# Patient Record
Sex: FEMALE | Race: WHITE | NOT HISPANIC OR LATINO | Employment: OTHER | URBAN - METROPOLITAN AREA
[De-identification: names, ages, dates, MRNs, and addresses within clinical notes are randomized per-mention and may not be internally consistent; named-entity substitution may affect disease eponyms.]

---

## 2018-05-29 ENCOUNTER — OFFICE VISIT (OUTPATIENT)
Dept: ORTHOPEDIC SURGERY | Facility: CLINIC | Age: 68
End: 2018-05-29

## 2018-05-29 VITALS — BODY MASS INDEX: 26.49 KG/M2 | WEIGHT: 159 LBS | TEMPERATURE: 98.1 F | HEIGHT: 65 IN

## 2018-05-29 DIAGNOSIS — Z96.653 HX OF TOTAL KNEE ARTHROPLASTY, BILATERAL: Primary | ICD-10-CM

## 2018-05-29 DIAGNOSIS — M70.50 PES ANSERINE BURSITIS: ICD-10-CM

## 2018-05-29 PROCEDURE — 73562 X-RAY EXAM OF KNEE 3: CPT | Performed by: NURSE PRACTITIONER

## 2018-05-29 PROCEDURE — 20610 DRAIN/INJ JOINT/BURSA W/O US: CPT | Performed by: NURSE PRACTITIONER

## 2018-05-29 PROCEDURE — 99213 OFFICE O/P EST LOW 20 MIN: CPT | Performed by: NURSE PRACTITIONER

## 2018-05-29 RX ORDER — METHYLPREDNISOLONE ACETATE 80 MG/ML
80 INJECTION, SUSPENSION INTRA-ARTICULAR; INTRALESIONAL; INTRAMUSCULAR; SOFT TISSUE
Status: COMPLETED | OUTPATIENT
Start: 2018-05-29 | End: 2018-05-29

## 2018-05-29 RX ORDER — MULTIVIT WITH MINERALS/LUTEIN
500 TABLET ORAL DAILY
COMMUNITY

## 2018-05-29 RX ORDER — BUPIVACAINE HYDROCHLORIDE 5 MG/ML
1 INJECTION, SOLUTION EPIDURAL; INTRACAUDAL
Status: COMPLETED | OUTPATIENT
Start: 2018-05-29 | End: 2018-05-29

## 2018-05-29 RX ORDER — MULTIPLE VITAMINS W/ MINERALS TAB 9MG-400MCG
1 TAB ORAL DAILY
COMMUNITY

## 2018-05-29 RX ADMIN — BUPIVACAINE HYDROCHLORIDE 1 ML: 5 INJECTION, SOLUTION EPIDURAL; INTRACAUDAL at 16:30

## 2018-05-29 RX ADMIN — METHYLPREDNISOLONE ACETATE 80 MG: 80 INJECTION, SUSPENSION INTRA-ARTICULAR; INTRALESIONAL; INTRAMUSCULAR; SOFT TISSUE at 16:30

## 2018-05-29 NOTE — PROGRESS NOTES
"Patient: Marline Bryant  YOB: 1950 67 y.o. female  Medical Record Number: [id number]    Chief Complaints:   Chief Complaint   Patient presents with   • Right Knee - Establish Care, Pain       History of Present Illness:Marline Bryant is a 67 y.o. female who presents With complaints of bilateral anterior knee pain.  She had both knees replaced with the right one in 2011 left knee replaced 2009.  She denies any injury.  She reports that both knee started getting sore about 3 months ago.  She describes the bilateral knee pain as a moderate intermittent ache worse with sitting and walking slightly better with anti-inflammatories.    Allergies: No Known Allergies    Medications:   Current Outpatient Prescriptions   Medication Sig Dispense Refill   • citalopram (CeleXA) 10 MG tablet Take 10 mg by mouth Daily.     • Fexofenadine HCl (ALLEGRA ALLERGY PO) Take  by mouth.     • Multiple Vitamins-Minerals (MULTIVITAMIN WITH MINERALS) tablet tablet Take 1 tablet by mouth Daily.     • vitamin C (ASCORBIC ACID) 250 MG tablet Take 500 mg by mouth Daily.       No current facility-administered medications for this visit.          The following portions of the patient's history were reviewed and updated as appropriate: allergies, current medications, past family history, past medical history, past social history, past surgical history and problem list.    Review of Systems:   A 14 point review of systems was performed. All systems negative except pertinent positives/negative listed in HPI above    Physical Exam:   Vitals:    05/29/18 1540   Temp: 98.1 °F (36.7 °C)   TempSrc: Temporal Artery    Weight: 72.1 kg (159 lb)   Height: 165.1 cm (65\")   PainSc: 5  Comment: right- hurts more at night   PainLoc: Knee       General: A and O x 3, ASA, NAD    SCLERA:    Normal    DENTITION:   Normal  Skin clear no unusual lesions noted  Bilateral knees patient very tender to touch over bilateral knee has anserine bursa but otherwise " has good range of motion noted bilaterally with no instability calf soft nontender    Radiology:  Xrays 3views (ap,lateral, sunrise) bilateral knees were ordered and reviewed today secondary to pain and show well-placed well-positioned bilateral total knee replacements.  Comparative views are unchanged     Assessment/Plan:  Bilateral knee has anserine bursitis    We will proceed with bilateral knee cortisone injections.  Prior to injections risks were discussed including pain, infection, elevated blood sugar.  Patient verbalized understanding like to proceed with injections.  She was referred outpatient physical therapy and we'll try and office in 4-6 weeks for follow-up of her pain does not resolve    Large Joint Arthrocentesis  Date/Time: 5/29/2018 4:30 PM  Consent given by: patient  Site marked: site marked  Timeout: Immediately prior to procedure a time out was called to verify the correct patient, procedure, equipment, support staff and site/side marked as required   Supporting Documentation  Indications: pain and joint swelling   Procedure Details  Location: knee - R knee  Preparation: Patient was prepped and draped in the usual sterile fashion  Needle size: 18 G  Approach: medial  Medications administered: 80 mg methylPREDNISolone acetate 80 MG/ML; 1 mL bupivacaine (PF) 0.5 %  Patient tolerance: patient tolerated the procedure well with no immediate complications    Large Joint Arthrocentesis  Date/Time: 5/29/2018 4:30 PM  Consent given by: patient  Site marked: site marked  Timeout: Immediately prior to procedure a time out was called to verify the correct patient, procedure, equipment, support staff and site/side marked as required   Supporting Documentation  Indications: joint swelling and pain   Procedure Details  Location: knee - L knee  Preparation: Patient was prepped and draped in the usual sterile fashion  Needle size: 18 G  Approach: medial  Medications administered: 80 mg methylPREDNISolone acetate  80 MG/ML; 1 mL bupivacaine (PF) 0.5 %

## 2018-08-07 ENCOUNTER — OFFICE VISIT (OUTPATIENT)
Dept: ORTHOPEDIC SURGERY | Facility: CLINIC | Age: 68
End: 2018-08-07

## 2018-08-07 VITALS — HEIGHT: 65 IN | BODY MASS INDEX: 28.49 KG/M2 | WEIGHT: 171 LBS | TEMPERATURE: 97.8 F

## 2018-08-07 DIAGNOSIS — M70.50 PES ANSERINE BURSITIS: Primary | ICD-10-CM

## 2018-08-07 PROCEDURE — 99213 OFFICE O/P EST LOW 20 MIN: CPT | Performed by: ORTHOPAEDIC SURGERY

## 2018-08-07 NOTE — PROGRESS NOTES
"Patient: Marline Bryant  YOB: 1950 68 y.o. female  Medical Record Number: [id number]    Chief Complaints:   Chief Complaint   Patient presents with   • Right Knee - Follow-up, Pain       History of Present Illness:Marline Bryant is a 68 y.o. female who presents for follow-up of  Bilateral knees.  She saw June few months ago and had has anserine bursa injections.  They helped for about 3-4 weeks but the pain is now back.  She describes an aching pain about the posterior leg medial knee and even into the posterior calf from time to time    Allergies: No Known Allergies    Medications:   Current Outpatient Prescriptions   Medication Sig Dispense Refill   • citalopram (CeleXA) 10 MG tablet Take 10 mg by mouth Daily.     • Multiple Vitamins-Minerals (MULTIVITAMIN WITH MINERALS) tablet tablet Take 1 tablet by mouth Daily.     • vitamin C (ASCORBIC ACID) 250 MG tablet Take 500 mg by mouth Daily.       No current facility-administered medications for this visit.          The following portions of the patient's history were reviewed and updated as appropriate: allergies, current medications, past family history, past medical history, past social history, past surgical history and problem list.    Review of Systems:   A 14 point review of systems was performed. All systems negative except pertinent positives/negative listed in HPI above    Physical Exam:   Vitals:    08/07/18 1249   Temp: 97.8 °F (36.6 °C)   Weight: 77.6 kg (171 lb)   Height: 165.1 cm (65\")       General: A and O x 3, ASA, NAD    SCLERA:    Normal    DENTITION:   Normal  There is tenderness to palpation about the peds anserine bursa there is no joint effusion no instability good quad strength hamstrings overall fairly tight    Radiology:  Xrays 3views both knees (ap,lateral, sunrise) taken previously demonstratinga well positioned knee replacement without evidence of wear, loosening or osteolysis      Assessment/Plan:  Bilateral knee pad as " anserine bursitis and Cramping I think she would benefit from some physical therapy.  I see no mechanical problems with the knees.  I will set her up with physical therapy and I'll see her back as needed.

## 2018-08-20 ENCOUNTER — TRANSCRIBE ORDERS (OUTPATIENT)
Dept: ADMINISTRATIVE | Facility: HOSPITAL | Age: 68
End: 2018-08-20

## 2018-08-20 DIAGNOSIS — Z78.0 POST-MENOPAUSAL: Primary | ICD-10-CM

## 2018-08-29 ENCOUNTER — HOSPITAL ENCOUNTER (OUTPATIENT)
Dept: BONE DENSITY | Facility: HOSPITAL | Age: 68
Discharge: HOME | End: 2018-08-29
Attending: INTERNAL MEDICINE | Admitting: INTERNAL MEDICINE

## 2018-08-29 DIAGNOSIS — Z78.0 POST-MENOPAUSAL: ICD-10-CM

## 2018-08-29 PROCEDURE — 77080 DXA BONE DENSITY AXIAL: CPT

## 2019-06-11 ENCOUNTER — OFFICE VISIT (OUTPATIENT)
Dept: ORTHOPEDIC SURGERY | Facility: CLINIC | Age: 69
End: 2019-06-11

## 2019-06-11 VITALS — HEIGHT: 65 IN | WEIGHT: 165 LBS | BODY MASS INDEX: 27.49 KG/M2 | TEMPERATURE: 98.5 F

## 2019-06-11 DIAGNOSIS — M70.50 PES ANSERINE BURSITIS: ICD-10-CM

## 2019-06-11 DIAGNOSIS — Z96.653 HX OF TOTAL KNEE ARTHROPLASTY, BILATERAL: Primary | ICD-10-CM

## 2019-06-11 PROCEDURE — 99213 OFFICE O/P EST LOW 20 MIN: CPT | Performed by: ORTHOPAEDIC SURGERY

## 2019-06-11 PROCEDURE — 73562 X-RAY EXAM OF KNEE 3: CPT | Performed by: ORTHOPAEDIC SURGERY

## 2019-06-11 NOTE — PROGRESS NOTES
"Patient: Marline Bryant  YOB: 1950 68 y.o. female  Medical Record Number: [id number]    Chief Complaints:   Chief Complaint   Patient presents with   • Left Knee - Follow-up, Pain   • Right Knee - Follow-up, Pain       History of Present Illness:Marline Bryant is a 68 y.o. female who presents for follow-up of bilateral medial knee pain which is severe at times both knees have been replaced.  She has developed has anserine bursitis had injections a while ago which helped but were short-lived.  Last time I saw her we talked about physical therapy but she is been very busy working 2 jobs on her feet all day.  Unfortunately the pain is progressed to where it is severe she has associated swelling and is quite limited    Allergies: No Known Allergies    Medications:   Current Outpatient Medications   Medication Sig Dispense Refill   • citalopram (CeleXA) 10 MG tablet Take 10 mg by mouth Daily.     • Multiple Vitamins-Minerals (MULTIVITAMIN WITH MINERALS) tablet tablet Take 1 tablet by mouth Daily.     • vitamin C (ASCORBIC ACID) 250 MG tablet Take 500 mg by mouth Daily.       No current facility-administered medications for this visit.          The following portions of the patient's history were reviewed and updated as appropriate: allergies, current medications, past family history, past medical history, past social history, past surgical history and problem list.    Review of Systems:   A 14 point review of systems was performed. All systems negative except pertinent positives/negative listed in HPI above    Physical Exam:   Vitals:    06/11/19 1108   Temp: 98.5 °F (36.9 °C)   Weight: 74.8 kg (165 lb)   Height: 165.1 cm (65\")       General: A and O x 3, ASA, NAD    SCLERA:    Normal    DENTITION:   Normal  Knee:  bilateral    ALIGNMENT:     Neutral  ,   Patella tracks   midline    GAIT:     Nonantalgic    SKIN:    No abnormality    RANGE OF MOTION:   0  -  135   DEG    STRENGTH:   5 / 5    LIGAMENTS:    " No varus / valgus instability.   Negative  Lachman.    MENISCUS:     Negative   Harvey       DISTAL PULSES:    Paplable    DISTAL SENSATION :   Intact    LYMPHATICS:     No   lymphadenopathy    OTHER:          - No  effusion      - No crepitance with ROM      +Swelling and tenderness to palpation pes anserine bursa     Radiology:  Xrays 3views both knees (ap,lateral, sunrise) were ordered and reviewed for evaluation of knee pain demonstrating well positioned knee replacements without evidence of wear, loosening or osteolysis  todays xrays were compared to previous xrays and demonstrate no change    Assessment/Plan:  Bilateral knee peds anserine bursitis I see no issues with her replacements radiographically or on exam.  I am going to have her see physical therapy for hamstring stretching local modalities and have given her prescription for anti-inflammatory gel to apply firmly twice a day.  If she is not better would consider injections again in the future

## 2019-06-18 ENCOUNTER — TREATMENT (OUTPATIENT)
Dept: PHYSICAL THERAPY | Facility: CLINIC | Age: 69
End: 2019-06-18

## 2019-06-18 DIAGNOSIS — M25.562 ACUTE PAIN OF LEFT KNEE: ICD-10-CM

## 2019-06-18 DIAGNOSIS — M25.561 ACUTE PAIN OF RIGHT KNEE: Primary | ICD-10-CM

## 2019-06-18 PROCEDURE — G0283 ELEC STIM OTHER THAN WOUND: HCPCS | Performed by: PHYSICAL THERAPIST

## 2019-06-18 PROCEDURE — 97161 PT EVAL LOW COMPLEX 20 MIN: CPT | Performed by: PHYSICAL THERAPIST

## 2019-06-18 PROCEDURE — 97110 THERAPEUTIC EXERCISES: CPT | Performed by: PHYSICAL THERAPIST

## 2019-06-18 NOTE — PROGRESS NOTES
Physical Therapy Initial Evaluation and Plan of Care        Subjective Evaluation    History of Present Illness  Mechanism of injury: Patient reports that she has had both knees replaced as well as hip and pelvic fracture  Right Knee: 2011  Right hip: 2014 posterior procedure  Left Knee: 2009  Pelvic break with hardware 2013    For the past year she has been having increased bilateral knee pain which is progressively been getting worse.  She reports that she has been unable to squat or kneel which she was previously able to do after her knee replacements.  She states that she has a hard time with standing, walking as needed.      She works two jobs.  One for [employer] in the [employer] and is currently off for the summer and the other is working for [employer].  Both require her to stand for long periods of time.      Pain  Quality: throbbing, dull ache and tight  Relieving factors: medications and ice  Aggravating factors: stairs, ambulation and standing  Progression: worsening    Social Support  Lives with: spouse             Objective       Observations   Left Knee   Positive for edema.     Right Knee   Positive for edema.     Tenderness   Left Knee   Tenderness in the inferior patella and lateral joint line.     Right Knee   Tenderness in the inferior patella and lateral joint line.     Active Range of Motion   Left Knee   Normal active range of motion    Right Knee   Normal active range of motion    Strength/Myotome Testing     Left Knee   Normal strength    Right Knee   Normal strength         Assessment & Plan     Assessment  Impairments: activity intolerance, lacks appropriate home exercise program and pain with function  Assessment details: Patient is a 67 y/o female who presents with bilateral knee pain.  She has history of TKR bilaterally >5 years ago.  Upon evaluation she has full AROM of both knees and 5/5 strength.  She does have increased tenderness along lateral joint line and patellar tendon with  some global edema.  She has shortened stride and  stance phase during gait R>L.  Moderate hamstring tightness bilaterally.   Prognosis: good  Functional Limitations: walking and uncomfortable because of pain  Goals  Plan Goals: Long Term Goals (12 weeks)    Patient is able to return to work/community activities with minimal to no discomfort  Patient is able stand for as long as needed for work/community related tasks.   Patient is able to sit for as long as needed for work/community related tasks.   Patient is able to reciprocally climb steps as needed for daily tasks.          Short Term Goals (6 weeks)    Patient is independent with HEP  Patient is able to sleep without being disrupted by pain.   Patient has greater than 50% improvement overall.   Minimal to no tenderness along lateral joint line.                 Plan  Therapy options: will be seen for skilled physical therapy services  Planned modality interventions: TENS, thermotherapy (hydrocollator packs) and cryotherapy  Planned therapy interventions: manual therapy, soft tissue mobilization, strengthening, stretching, therapeutic activities, joint mobilization, home exercise program, functional ROM exercises, flexibility and body mechanics training  Frequency: 2x week  Duration in weeks: 12  Treatment plan discussed with: patient        Manual Therapy:    0     mins  08846;  Therapeutic Exercise:    25     mins  51116;     Neuromuscular Daniel:    0    mins  43716;    Therapeutic Activity:     0     mins  53842;     Gait Trainin     mins  38922;     Ultrasound:     0     mins  66289;    Work Hardening           0      mins 20882  Iontophoresis               0   mins 90559    Timed Treatment:   25   mins   Total Treatment:     55   mins    PT SIGNATURE: Julia Smith, PT   DATE TREATMENT INITIATED: 2019    Initial Certification  Certification Period: 2019  I certify that the therapy services are furnished while this patient is  under my care.  The services outlined above are required by this patient, and will be reviewed every 90 days.     PHYSICIAN: Ugo Villaseñor MD      DATE:     Please sign and return via fax to [phone].. Thank you, [facility].

## 2019-06-26 ENCOUNTER — TREATMENT (OUTPATIENT)
Dept: PHYSICAL THERAPY | Facility: CLINIC | Age: 69
End: 2019-06-26

## 2019-06-26 DIAGNOSIS — M25.561 ACUTE PAIN OF RIGHT KNEE: Primary | ICD-10-CM

## 2019-06-26 DIAGNOSIS — M25.562 ACUTE PAIN OF LEFT KNEE: ICD-10-CM

## 2019-06-26 PROCEDURE — 97110 THERAPEUTIC EXERCISES: CPT | Performed by: PHYSICAL THERAPIST

## 2019-06-26 NOTE — PROGRESS NOTES
"Physical Therapy Daily Progress Note    Marline Bryant reports: \"My knees are not feeling too bad today.  I have been doing the exercises every day except over the weekend because I was camping.  I'm not sure whether the electrodes and ice helped any or not.\"    Subjective     Objective   See Exercise, Manual, and Modality Logs for complete treatment.   *Initiated SLR, hip add erik, and glut sets    Assessment & Plan     Assessment  Assessment details: Patient reports good tolerance to initial HEP and verbalizes compliance except for over the weekend.  She declines modality application this date, citing her knees \"feel pretty good\" following treatment.  Discussed at length the role of strong, effectively functioning hips in position and function of the knees, leading to necessity of proximal strengthening to reduce pain and improve function.  Patient verbalizes understanding.        Progress strengthening /stabilization /functional activity with proximal strengthening emphasis.          Manual Therapy:         mins  73278;  Therapeutic Exercise:    33     mins  54963;     Neuromuscular Daniel:        mins  49671;    Therapeutic Activity:          mins  31912;     Gait Training:           mins  40491;     Ultrasound:          mins  51338;    Electrical Stimulation:         mins  28416 ( );  Dry Needling          mins self-pay    Timed Treatment:   33   mins   Total Treatment:     37   mins    Camilla Tyler PT, DPT  Physical Therapist  [place] License # [id number]    "

## 2019-06-28 ENCOUNTER — TREATMENT (OUTPATIENT)
Dept: PHYSICAL THERAPY | Facility: CLINIC | Age: 69
End: 2019-06-28

## 2019-06-28 DIAGNOSIS — M25.561 ACUTE PAIN OF RIGHT KNEE: Primary | ICD-10-CM

## 2019-06-28 DIAGNOSIS — M25.562 ACUTE PAIN OF LEFT KNEE: ICD-10-CM

## 2019-06-28 PROCEDURE — 97530 THERAPEUTIC ACTIVITIES: CPT | Performed by: PHYSICAL THERAPIST

## 2019-06-28 PROCEDURE — 97110 THERAPEUTIC EXERCISES: CPT | Performed by: PHYSICAL THERAPIST

## 2019-06-28 NOTE — PROGRESS NOTES
"Physical Therapy Daily Progress Note    Marline Bryant reports: \"The only thing that's sore on me are the soles of my feet/heels due to cysts I have there.  I think just moving and stretching more has made them a little sore but it's not bad.\"    Subjective     Objective   See Exercise, Manual, and Modality Logs for complete treatment.       Assessment & Plan     Assessment  Assessment details: Patient requires extensive verbal and tactile cueing to achieve proper alignment during performance of hip abduction in sidelying as she tends heavily toward pelvic retraction and rotation into hip flexion instead.  Discussed at length with patient the basic mechanics of a successful sit to stand: 1) scoot to edge of table 2) pull feet back under knees and 3) lean forward to unweight hips.  Patient demonstrates ability to utilize this strategy during sit to stand transfer which initially requires (B) UE assist and with increased repetitions is performed without UE assist and good concentric and eccentric control.          Progress strengthening /stabilization /functional activity         Manual Therapy:         mins  31618;  Therapeutic Exercise:    32     mins  02353;     Neuromuscular Daniel:        mins  95256;    Therapeutic Activity:     9     mins  10816;     Gait Training:           mins  44363;     Ultrasound:          mins  90767;    Electrical Stimulation:         mins  07497 ( );  Dry Needling          mins self-pay    Timed Treatment:   41   mins   Total Treatment:     43  mins    Camilla Tyler PT, DPT  Physical Therapist  [place] License # [id number]    "

## 2019-07-03 ENCOUNTER — TREATMENT (OUTPATIENT)
Dept: PHYSICAL THERAPY | Facility: CLINIC | Age: 69
End: 2019-07-03

## 2019-07-03 DIAGNOSIS — M25.561 ACUTE PAIN OF RIGHT KNEE: Primary | ICD-10-CM

## 2019-07-03 DIAGNOSIS — M25.562 ACUTE PAIN OF LEFT KNEE: ICD-10-CM

## 2019-07-03 PROCEDURE — 97110 THERAPEUTIC EXERCISES: CPT | Performed by: PHYSICAL THERAPIST

## 2019-07-03 NOTE — PROGRESS NOTES
"Physical Therapy Daily Progress Note    Mraline Bryant reports: \"My knees are feeling much better, but I also haven't been up on them lately to test their limits. They aren't as swollen as they were.  I'm also using that technique to get up out of chairs easier at home.\"    Subjective     Objective   See Exercise, Manual, and Modality Logs for complete treatment.   *Increased reps of SLR and hip abduction  *Initiated clamshells    Assessment & Plan     Assessment  Assessment details: Patient demonstrates greatly improved ease of sit to stand activity without UE assist and is able to independently recall 3 steps of sit to stand activity.  She exhibits signs of muscular fatigue proximally > distally (B) during strengthening activities but is able to increase repetitions performed as well as initiate clamshell activity.  Encouraged persistent HEP compliance and self-progression of repetitions, etc while monitoring for excessive fatigue.         Progress strengthening /stabilization /functional activity. Reassess knee ROM and LE strength.         Manual Therapy:         mins  52866;  Therapeutic Exercise:    39     mins  65475;     Neuromuscular Daniel:        mins  97295;    Therapeutic Activity:          mins  72321;     Gait Training:           mins  26217;     Ultrasound:          mins  32274;    Electrical Stimulation:         mins  28901 ( );  Dry Needling          mins self-pay    Timed Treatment:   39   mins   Total Treatment:     39   mins    Camilla Tyler PT, DPT  Physical Therapist  [place] License # [id number]    "

## 2020-01-30 ENCOUNTER — DOCUMENTATION (OUTPATIENT)
Dept: PHYSICAL THERAPY | Facility: CLINIC | Age: 70
End: 2020-01-30

## 2020-01-30 DIAGNOSIS — M25.562 ACUTE PAIN OF LEFT KNEE: ICD-10-CM

## 2020-01-30 DIAGNOSIS — M25.561 ACUTE PAIN OF RIGHT KNEE: Primary | ICD-10-CM

## 2020-01-30 NOTE — PROGRESS NOTES
Discharge Summary  Discharge Summary from Physical Therapy Report    Patient Information  Marline Bryant  1950    Dates  PT visit: 06/18/19 - 07/03/19  Number of Visits: 4     Discharge Status of Patient: See final Note dated 07/03/19    Goals: Not Met    Visit Diagnoses:    ICD-10-CM ICD-9-CM   1. Acute pain of right knee M25.561 719.46   2. Acute pain of left knee M25.562 719.46       Discharge Plan: Continue with current home exercise program as instructed    Comments Patient did not return for further PT    Date of Discharge 01/30/2020        Camilla Tyler, PT, DPT  Physical Therapist

## 2020-11-23 ENCOUNTER — TELEPHONE (OUTPATIENT)
Dept: ORTHOPEDIC SURGERY | Facility: CLINIC | Age: 70
End: 2020-11-23

## 2020-11-23 NOTE — TELEPHONE ENCOUNTER
TRIED WARM TRANSFERRING W/NO ANSWER.  PT IS HAVING LEFT KNEE PAIN AND SWELLING. PT STATES THAT THERE IS A PEA SIZE PIECE ROLLING AROUND IN KNEE.  FIRST AVAILABLE APPT. IS December 24TH WITH BEV MURILLO.    LEN SIFUENTES MAY BE REACHED AT:  [phone]

## 2020-11-30 ENCOUNTER — OFFICE VISIT (OUTPATIENT)
Dept: ORTHOPEDIC SURGERY | Facility: CLINIC | Age: 70
End: 2020-11-30

## 2020-11-30 ENCOUNTER — LAB (OUTPATIENT)
Dept: LAB | Facility: HOSPITAL | Age: 70
End: 2020-11-30

## 2020-11-30 VITALS — TEMPERATURE: 96.6 F | BODY MASS INDEX: 30.53 KG/M2 | WEIGHT: 190 LBS | HEIGHT: 66 IN

## 2020-11-30 DIAGNOSIS — M25.562 ACUTE PAIN OF LEFT KNEE: ICD-10-CM

## 2020-11-30 DIAGNOSIS — Z96.653 HX OF TOTAL KNEE ARTHROPLASTY, BILATERAL: Primary | ICD-10-CM

## 2020-11-30 LAB
CRP SERPL-MCNC: 0.45 MG/DL (ref 0–0.5)
ERYTHROCYTE [SEDIMENTATION RATE] IN BLOOD: 13 MM/HR (ref 0–30)

## 2020-11-30 PROCEDURE — 73562 X-RAY EXAM OF KNEE 3: CPT | Performed by: NURSE PRACTITIONER

## 2020-11-30 PROCEDURE — 99214 OFFICE O/P EST MOD 30 MIN: CPT | Performed by: NURSE PRACTITIONER

## 2020-11-30 PROCEDURE — 36415 COLL VENOUS BLD VENIPUNCTURE: CPT

## 2020-11-30 PROCEDURE — 86140 C-REACTIVE PROTEIN: CPT

## 2020-11-30 PROCEDURE — 85652 RBC SED RATE AUTOMATED: CPT

## 2020-11-30 RX ORDER — ERGOCALCIFEROL (VITAMIN D2) 10 MCG
400 TABLET ORAL DAILY
COMMUNITY

## 2020-11-30 RX ORDER — THIAMINE MONONITRATE (VIT B1) 100 MG
100 TABLET ORAL DAILY
COMMUNITY

## 2020-12-10 ENCOUNTER — HOSPITAL ENCOUNTER (OUTPATIENT)
Dept: NUCLEAR MEDICINE | Facility: HOSPITAL | Age: 70
Discharge: HOME | End: 2020-12-10

## 2020-12-10 DIAGNOSIS — M25.562 ACUTE PAIN OF LEFT KNEE: ICD-10-CM

## 2020-12-10 PROCEDURE — 78315 BONE IMAGING 3 PHASE: CPT

## 2020-12-10 PROCEDURE — A9503 TC99M MEDRONATE: HCPCS | Performed by: NURSE PRACTITIONER

## 2020-12-10 PROCEDURE — 0 TECHNETIUM MEDRONATE KIT: Performed by: NURSE PRACTITIONER

## 2020-12-10 RX ORDER — TC 99M MEDRONATE 20 MG/10ML
18 INJECTION, POWDER, LYOPHILIZED, FOR SOLUTION INTRAVENOUS
Status: COMPLETED | OUTPATIENT
Start: 2020-12-10 | End: 2020-12-10

## 2020-12-10 RX ADMIN — Medication 18 MILLICURIE: at 09:00

## 2020-12-22 ENCOUNTER — OFFICE VISIT (OUTPATIENT)
Dept: ORTHOPEDIC SURGERY | Facility: CLINIC | Age: 70
End: 2020-12-22

## 2020-12-22 VITALS — WEIGHT: 204.2 LBS | TEMPERATURE: 96.6 F | HEIGHT: 66 IN | BODY MASS INDEX: 32.82 KG/M2

## 2020-12-22 DIAGNOSIS — Z96.652 STATUS POST LEFT KNEE REPLACEMENT: Primary | ICD-10-CM

## 2020-12-22 PROCEDURE — 99212 OFFICE O/P EST SF 10 MIN: CPT | Performed by: ORTHOPAEDIC SURGERY

## 2020-12-22 NOTE — PROGRESS NOTES
"Patient: Marline Bryant  YOB: 1950 70 y.o. female  Medical Record Number: [id number]    Chief Complaints:   Chief Complaint   Patient presents with   • Left Knee - Establish Care, Pain       History of Present Illness:Marline Bryant is a 70 y.o. female who presents for follow-up of left total knee overall was doing well but she developed some soreness around the Pez anserine bursa had injection in the past.  She saw June was having quite a bit of pain at that point.  Labs and bone scan were ordered and she is here for follow-up.  She actually states the pain and swelling today is markedly improved to minimal at this time.    Allergies: No Known Allergies    Medications:   Current Outpatient Medications   Medication Sig Dispense Refill   • citalopram (CeleXA) 10 MG tablet Take 10 mg by mouth Daily.     • Multiple Vitamins-Minerals (MULTIVITAMIN WITH MINERALS) tablet tablet Take 1 tablet by mouth Daily.     • thiamine (VITAMIN B-1) 100 MG tablet Take 100 mg by mouth Daily.     • vitamin C (ASCORBIC ACID) 250 MG tablet Take 500 mg by mouth Daily.     • Vitamin D, Cholecalciferol, (CHOLECALCIFEROL) 10 MCG (400 UNIT) tablet Take 400 Units by mouth Daily.       No current facility-administered medications for this visit.          The following portions of the patient's history were reviewed and updated as appropriate: allergies, current medications, past family history, past medical history, past social history, past surgical history and problem list.    Review of Systems:   A 14 point review of systems was performed. All systems negative except pertinent positives/negative listed in HPI above    Physical Exam:   Vitals:    12/22/20 1419   Temp: 96.6 °F (35.9 °C)   Weight: 92.6 kg (204 lb 3.2 oz)   Height: 167.6 cm (66\")   PainSc:   2       General: A and O x 3, ASA, NAD    SCLERA:    Normal    DENTITION:   Normal  LABS; esr, CRP-  nl    Radiology: bone scan : IMPRESSION:  Essentially negative triple " phase bone scan.  Both knees  demonstrate symmetric degrees of uptake. This is with the exception of  mild increased soft tissue uptake to the suprapatellar region of the  right knee as compared to the left which may indicate mild synovitis or  hyperemia in this region. No abnormal uptake to suggest fracture.     This report was finalized on 12/10/2020 4:13 PM by Dr. Norm Mclaughlin M.D.  Assessment/Plan:  Left painful TKA-  Labs and bone scan normal -  RTO if symptoms return so I can look at her

## 2021-03-15 ENCOUNTER — BULK ORDERING (OUTPATIENT)
Dept: CASE MANAGEMENT | Facility: OTHER | Age: 71
End: 2021-03-15

## 2021-03-15 DIAGNOSIS — Z23 IMMUNIZATION DUE: ICD-10-CM

## 2021-09-27 ENCOUNTER — APPOINTMENT (OUTPATIENT)
Dept: CT IMAGING | Facility: HOSPITAL | Age: 71
End: 2021-09-27

## 2021-09-27 ENCOUNTER — HOSPITAL ENCOUNTER (EMERGENCY)
Facility: HOSPITAL | Age: 71
Discharge: HOME | End: 2021-09-27
Attending: EMERGENCY MEDICINE | Admitting: EMERGENCY MEDICINE

## 2021-09-27 VITALS
TEMPERATURE: 97.7 F | DIASTOLIC BLOOD PRESSURE: 71 MMHG | BODY MASS INDEX: 33.32 KG/M2 | HEART RATE: 81 BPM | WEIGHT: 200 LBS | OXYGEN SATURATION: 97 % | HEIGHT: 65 IN | SYSTOLIC BLOOD PRESSURE: 169 MMHG | RESPIRATION RATE: 16 BRPM

## 2021-09-27 DIAGNOSIS — S16.1XXA STRAIN OF NECK MUSCLE, INITIAL ENCOUNTER: ICD-10-CM
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
## 2021-09-27 DIAGNOSIS — S00.03XA CONTUSION OF SCALP, INITIAL ENCOUNTER: ICD-10-CM

## 2021-09-27 PROCEDURE — 70450 CT HEAD/BRAIN W/O DYE: CPT

## 2021-09-27 PROCEDURE — 99282 EMERGENCY DEPT VISIT SF MDM: CPT

## 2021-09-27 PROCEDURE — 72125 CT NECK SPINE W/O DYE: CPT

## 2021-09-27 NOTE — ED PROVIDER NOTES
Brief history of present illness: 71-year-old female presents 2 days status post fall with head injury complaining of hematoma to the head and some mild neck discomfort with certain movements but denies any chest pain, shortness of breath, nausea, vomiting, focal numbness or weakness, vision change, or altered mental status.    Physical exam:   ED Triage Vitals [09/27/21 1842]   Temp Heart Rate Resp BP SpO2   97.7 °F (36.5 °C) 81 16 140/72 97 %      Temp src Heart Rate Source Patient Position BP Location FiO2 (%)   Tympanic Monitor Sitting Left arm --     Alert and oriented.  Very pleasant.  No acute distress.    Pain is range of motion the neck is noted with no midline tenderness to palpation.  C5-8 motor and sensory grossly intact.  There is a hematoma the right occipital parietal area with no other evidence of basilar skull fracture.  Normal mood and affect.  Francoise, EOMI.  Pink warm and well-perfused with no respiratory distress.    MDM: Agree with plan for radiologic evaluation to assess for any acute life threats.    CT Head Without Contrast    Result Date: 9/27/2021  Narrative: CT OF THE BRAIN WITHOUT CONTRAST 09/27/2021  HISTORY: Fell, head trauma.  TECHNIQUE: Axial images were obtained through the brain without intravenous contrast.  FINDINGS:  There is a moderately large scalp hematoma over the right posterior parietal region. No skull fractures are seen.  There is mild-to-moderate diffuse atrophy. No midline shift is seen. There is no evidence of intracranial hemorrhage, or mass effect.      Impression: 1. Right posterior parietal scalp hematoma. 2. No acute intracranial process.  Radiation dose reduction techniques were utilized, including automated exposure control and exposure modulation based on body size.        CT Cervical Spine Without Contrast    Result Date: 9/27/2021  Narrative: CT OF THE CERVICAL SPINE WITHOUT CONTRAST 09/27/2021  HISTORY: Fell, neck pain.  TECHNIQUE: Axial images were obtained  from the skull base to the upper thoracic spine. Sagittal and coronal reconstruction images were reviewed.  FINDINGS:  There is some minimal degenerative disease of the C1-C2 articulation. There is C2-3, C3-4, C4-5 and C5-6 spondylosis. There is very minimal anterolisthesis of C6 on C7. No other subluxation is seen.  No cervical spine fractures are seen.      Impression: 1. Multilevel cervical degenerative disease. 2. No fractures are seen.  Radiation dose reduction techniques were utilized, including automated exposure control and exposure modulation based on body size.        Patient updated with results and agreeable with plan for discharge.  I have seen and personally evaluated this patient, discussed the case with the treating advanced practice provider, and reviewed their note. I was involved in the medical decision making during the evaluation, testing and disposition planning for this patient.            Franky Hartman MD  09/27/21 0750

## 2021-09-27 NOTE — ED NOTES
Pt reports slip and fall on Saturday hitting head on concrete. Was seen at [facility] and sent here for further eval. Denies LOC or blood thinners. Has bruise and knot on R posterior head. C/o head and neck pain, denies n/v, numbness or tingling. Pt a&ox4, abc's intact, NAD noted, ambulatory in room.     Pt noted to have mask on when this RN entered the room.  This RN wore appropriate PPE throughout our encounter. Hand hygiene performed upon entering and exiting room.         Violeta Gates, CHENTE  09/27/21 0920

## 2021-09-27 NOTE — ED PROVIDER NOTES
EMERGENCY DEPARTMENT ENCOUNTER    Room Number:  A01/01  Date of encounter:  9/27/2021  PCP: Susy Pemberton MD  Historian: Patient      I used full protective equipment while examining this patient.  This includes face mask, gloves and protective eyewear.  I washed my hands before entering the room and immediately upon leaving the room      HPI:  Chief Complaint: Fall  A complete HPI/ROS/PMH/PSH/SH/FH are unobtainable due to: Nothing    Context: Marline Bryant is a 71 y.o. female who presents to the ED c/o injury sustained in a fall 2 days ago.  Patient states she slipped down a set of stairs.  She landed on her posterior scalp.  She denies any loss of consciousness.  She was temporarily dazed.  Patient is not on any blood thinners.  She does have a mild occipital headache.  She denies any numbness or tingling to the extremities.  She denies any nausea, vomiting, lightheadedness.  She does have mild neck pain.  Patient initially went to immediate care center was referred to the ER for further evaluation.    Review of Medical Records  I reviewed patient's last orthopedic office visit from 12/22/2020.  Patient seen for left knee replacement.    PAST MEDICAL HISTORY  Active Ambulatory Problems     Diagnosis Date Noted   • No Active Ambulatory Problems     Resolved Ambulatory Problems     Diagnosis Date Noted   • No Resolved Ambulatory Problems     Past Medical History:   Diagnosis Date   • Arthritis    • Bilateral bunions    • Depression    • Fracture of pubic ramus (CMS/HCC)    • Hearing loss    • Plantar fasciitis          PAST SURGICAL HISTORY  Past Surgical History:   Procedure Laterality Date   • JOINT REPLACEMENT     • TOTAL HIP ARTHROPLASTY Right 06/09/2014   • TOTAL KNEE ARTHROPLASTY Right 09/06/2011   • TOTAL KNEE ARTHROPLASTY Left 03/10/2009         FAMILY HISTORY  Family History   Problem Relation Age of Onset   • Hypertension Other    • Ovarian cancer Other          SOCIAL HISTORY  Social History      Socioeconomic History   • Marital status:      Spouse name: Not on file   • Number of children: Not on file   • Years of education: Not on file   • Highest education level: Not on file   Tobacco Use   • Smoking status: Never Smoker   • Smokeless tobacco: Never Used   Substance and Sexual Activity   • Alcohol use: Yes     Comment: SOCIAL   • Drug use: No   • Sexual activity: Defer         ALLERGIES  Patient has no known allergies.        REVIEW OF SYSTEMS  All systems reviewed and negative except for those discussed in HPI.       PHYSICAL EXAM    I have reviewed the triage vital signs and nursing notes.    ED Triage Vitals [09/27/21 1842]   Temp Heart Rate Resp BP SpO2   97.7 °F (36.5 °C) 81 16 140/72 97 %      Temp src Heart Rate Source Patient Position BP Location FiO2 (%)   Tympanic Monitor Sitting Left arm --       Physical Exam  GENERAL: Alert, oriented, not distressed  HENT: Hematoma to right occipital scalp.  Mild cervical tenderness without vertebral step-off.  EYES: no scleral icterus, EOMI  CV: regular rhythm, regular rate, no murmur  RESPIRATORY: normal effort, CTA  ABDOMEN: soft, nontender  MUSCULOSKELETAL: no deformity, FROM, no calf swelling or tenderness  NEURO: alert, m cranial nerves II through XII grossly intact, normal cerebellar function  SKIN: warm, dry      RADIOLOGY  No Radiology Exams Resulted Within Past 24 Hours    I ordered the above noted radiological studies. Reviewed by me and discussed with radiologist.  See dictation for official radiology interpretation.      MEDICATIONS GIVEN IN ER    Medications - No data to display      PROGRESS, DATA ANALYSIS, CONSULTS, AND MEDICAL DECISION MAKING    All labs have been independently reviewed by me.  All radiology studies have been reviewed by me and discussed with radiologist dictating the report.   EKG's independently viewed and interpreted by me.  Discussion below represents my analysis of pertinent findings related to patient's  condition, differential diagnosis, treatment plan and final disposition.    I have discussed case with Dr. Hartman, emergency room physician.  He has performed his own bedside examination and agrees with treatment plan.    ED Course as of Sep 27 1929   Mon Sep 27, 2021   1906 Patient presents with head trauma 2 days after mechanical fall.  No blood thinners, no neuro deficits.  Plan obtain head and cervical spine CTs to rule out intracranial hemorrhage/fracture.    [EE]   1929 Care turned over to Dr. Hartman pending results of CT scans.    [EE]      ED Course User Index  [EE] Robbi Giles PA       AS OF 19:11 EDT VITALS:    BP - 169/71  HR - 81  TEMP - 97.7 °F (36.5 °C) (Tympanic)  O2 SATS - 97%        DIAGNOSIS  Final diagnoses:   Fall, initial encounter   Contusion of scalp, initial encounter   Strain of neck muscle, initial encounter         DISPOSITION  Pending           Robbi Giles PA  09/27/21 1929

## 2022-04-23 NOTE — PROGRESS NOTES
"Patient: Marline Bryant  YOB: 1950 70 y.o. female  Medical Record Number: [id number]    Chief Complaints: Left knee pain    History of Present Illness:Marline Bryant is a 70 y.o. female who presents with new complaint of left knee pain.  The patient had previous left total knee replacement in 2009, had been doing great until about a month ago, she had acute onset of pain swelling warmth heat coming from her knee.  She denies any injury.  The swelling has since resolved but the pain has persisted.  She describes the pain as a moderate shooting type pain with stiffness, worse with standing walking climbing stairs only slightly better with rest.  She denies any recent injury.    Allergies: No Known Allergies    Medications:   Current Outpatient Medications   Medication Sig Dispense Refill   • citalopram (CeleXA) 10 MG tablet Take 10 mg by mouth Daily.     • Multiple Vitamins-Minerals (MULTIVITAMIN WITH MINERALS) tablet tablet Take 1 tablet by mouth Daily.     • vitamin C (ASCORBIC ACID) 250 MG tablet Take 500 mg by mouth Daily.       No current facility-administered medications for this visit.          The following portions of the patient's history were reviewed and updated as appropriate: allergies, current medications, past family history, past medical history, past social history, past surgical history and problem list.    Review of Systems:   A 14 point review of systems was performed. All systems negative except pertinent positives/negative listed in HPI above    Physical Exam:   Vitals:    11/30/20 0927   Temp: 96.6 °F (35.9 °C)   Weight: 86.2 kg (190 lb)   Height: 167.6 cm (66\")   PainSc:   7       General: A and O x 3, ASA, NAD    SCLERA:    Normal    DENTITION:   Normal  Skin clear no unusual lesions noted  Left knee patient has no appreciable effusion 110 degrees flexion neutral extension with no instability she does have 3 palpable palpable solid bodies noted anterior aspect of the knee, calf " is soft and nontender       Radiology:  Xrays 3views (ap,lateral, sunrise) left knee were ordered and reviewed today secondary to pain and show well-placed well-positioned left total knee replacement.  She does have what appears to be 3 foreign objects noted anterior left knee compared to views show new foreign objects.  Not appreciated on previous x-rays.    Assessment/Plan: Painful left total knee replacement    We will proceed with CRP, sed rate, bone scan attention to left knee given the significant pain she has had as well as foreign bodies.  She will follow-up with us in about 3 weeks regarding results and treatment options.  She will call immediately if her symptoms worsen we will continue with Tylenol and ice as needed      Khadijah Granados, APRN    11/30/2020   2y11m M w/ no pmhx presenting for evaluation of fever x6 days starting Mon 4/18 2y11m M w/ no pmhx presenting for evaluation of fever x6 days starting Mon 4/18. + uri symptoms. Dec PO  Immunizations are up to date

## 2022-11-11 ENCOUNTER — HOSPITAL ENCOUNTER (OUTPATIENT)
Dept: GENERAL RADIOLOGY | Facility: HOSPITAL | Age: 72
Discharge: HOME | End: 2022-11-11

## 2022-11-11 ENCOUNTER — HOSPITAL ENCOUNTER (OUTPATIENT)
Dept: CARDIOLOGY | Facility: HOSPITAL | Age: 72
Discharge: HOME | End: 2022-11-11

## 2022-11-11 ENCOUNTER — TRANSCRIBE ORDERS (OUTPATIENT)
Dept: ADMINISTRATIVE | Facility: HOSPITAL | Age: 72
End: 2022-11-11

## 2022-11-11 ENCOUNTER — LAB (OUTPATIENT)
Dept: LAB | Facility: HOSPITAL | Age: 72
End: 2022-11-11

## 2022-11-11 DIAGNOSIS — Z01.818 PRE-OP EXAM: ICD-10-CM

## 2022-11-11 DIAGNOSIS — Z01.818 PRE-OP TESTING: ICD-10-CM

## 2022-11-11 DIAGNOSIS — R79.1 ABNORMAL COAGULATION PROFILE: ICD-10-CM

## 2022-11-11 DIAGNOSIS — Z01.818 PRE-OP EXAMINATION: Primary | ICD-10-CM

## 2022-11-11 DIAGNOSIS — Z01.818 PRE-OP EXAM: Primary | ICD-10-CM

## 2022-11-11 DIAGNOSIS — Z01.818 PRE-OP EXAMINATION: ICD-10-CM

## 2022-11-11 LAB
ANION GAP SERPL CALCULATED.3IONS-SCNC: 10 MMOL/L (ref 5–15)
BUN SERPL-MCNC: 15 MG/DL (ref 8–23)
BUN/CREAT SERPL: 18.5 (ref 7–25)
CALCIUM SPEC-SCNC: 9.2 MG/DL (ref 8.6–10.5)
CHLORIDE SERPL-SCNC: 106 MMOL/L (ref 98–107)
CO2 SERPL-SCNC: 24 MMOL/L (ref 22–29)
CREAT SERPL-MCNC: 0.81 MG/DL (ref 0.57–1)
DEPRECATED RDW RBC AUTO: 51.1 FL (ref 37–54)
EGFRCR SERPLBLD CKD-EPI 2021: 76.8 ML/MIN/1.73
ERYTHROCYTE [DISTWIDTH] IN BLOOD BY AUTOMATED COUNT: 14.5 % (ref 12.3–15.4)
GLUCOSE SERPL-MCNC: 90 MG/DL (ref 65–99)
HCT VFR BLD AUTO: 36.9 % (ref 34–46.6)
HGB BLD-MCNC: 11.8 G/DL (ref 12–15.9)
INR PPP: 0.95 (ref 0.9–1.1)
MCH RBC QN AUTO: 31 PG (ref 26.6–33)
MCHC RBC AUTO-ENTMCNC: 32 G/DL (ref 31.5–35.7)
MCV RBC AUTO: 96.9 FL (ref 79–97)
PLATELET # BLD AUTO: 258 10*3/MM3 (ref 140–450)
PMV BLD AUTO: 10.9 FL (ref 6–12)
POTASSIUM SERPL-SCNC: 4.5 MMOL/L (ref 3.5–5.2)
PROTHROMBIN TIME: 12.8 SECONDS (ref 11.7–14.2)
QT INTERVAL: 432 MS
RBC # BLD AUTO: 3.81 10*6/MM3 (ref 3.77–5.28)
SODIUM SERPL-SCNC: 140 MMOL/L (ref 136–145)
WBC NRBC COR # BLD: 4.4 10*3/MM3 (ref 3.4–10.8)

## 2022-11-11 PROCEDURE — 93010 ELECTROCARDIOGRAM REPORT: CPT | Performed by: INTERNAL MEDICINE

## 2022-11-11 PROCEDURE — 36415 COLL VENOUS BLD VENIPUNCTURE: CPT

## 2022-11-11 PROCEDURE — 93005 ELECTROCARDIOGRAM TRACING: CPT | Performed by: PODIATRIST

## 2022-11-11 PROCEDURE — 85027 COMPLETE CBC AUTOMATED: CPT

## 2022-11-11 PROCEDURE — 71046 X-RAY EXAM CHEST 2 VIEWS: CPT

## 2022-11-11 PROCEDURE — 85610 PROTHROMBIN TIME: CPT

## 2022-11-11 PROCEDURE — 80048 BASIC METABOLIC PNL TOTAL CA: CPT

## 2025-04-28 ENCOUNTER — OFFICE VISIT (OUTPATIENT)
Dept: ORTHOPEDIC SURGERY | Facility: CLINIC | Age: 75
End: 2025-04-28
Payer: MEDICARE

## 2025-04-28 VITALS — WEIGHT: 204 LBS | TEMPERATURE: 97.7 F | HEIGHT: 65 IN | BODY MASS INDEX: 33.99 KG/M2

## 2025-04-28 DIAGNOSIS — M75.42 IMPINGEMENT SYNDROME OF LEFT SHOULDER: Primary | ICD-10-CM

## 2025-04-28 DIAGNOSIS — M25.512 ACUTE PAIN OF LEFT SHOULDER: ICD-10-CM

## 2025-04-28 PROCEDURE — 73030 X-RAY EXAM OF SHOULDER: CPT | Performed by: NURSE PRACTITIONER

## 2025-04-28 PROCEDURE — 20610 DRAIN/INJ JOINT/BURSA W/O US: CPT | Performed by: NURSE PRACTITIONER

## 2025-04-28 PROCEDURE — 99203 OFFICE O/P NEW LOW 30 MIN: CPT | Performed by: NURSE PRACTITIONER

## 2025-04-28 PROCEDURE — 1160F RVW MEDS BY RX/DR IN RCRD: CPT | Performed by: NURSE PRACTITIONER

## 2025-04-28 PROCEDURE — 1159F MED LIST DOCD IN RCRD: CPT | Performed by: NURSE PRACTITIONER

## 2025-04-28 RX ORDER — METHYLPREDNISOLONE ACETATE 80 MG/ML
80 INJECTION, SUSPENSION INTRA-ARTICULAR; INTRALESIONAL; INTRAMUSCULAR; SOFT TISSUE
Status: COMPLETED | OUTPATIENT
Start: 2025-04-28 | End: 2025-04-28

## 2025-04-28 RX ORDER — LIDOCAINE HYDROCHLORIDE 10 MG/ML
2 INJECTION, SOLUTION EPIDURAL; INFILTRATION; INTRACAUDAL; PERINEURAL
Status: COMPLETED | OUTPATIENT
Start: 2025-04-28 | End: 2025-04-28

## 2025-04-28 RX ORDER — DORZOLAMIDE HYDROCHLORIDE AND TIMOLOL MALEATE 20; 5 MG/ML; MG/ML
SOLUTION/ DROPS OPHTHALMIC
COMMUNITY
Start: 2025-04-23

## 2025-04-28 RX ORDER — FLUTICASONE PROPIONATE 50 MCG
1 SPRAY, SUSPENSION (ML) NASAL DAILY
COMMUNITY

## 2025-04-28 RX ORDER — LATANOPROST 50 UG/ML
SOLUTION/ DROPS OPHTHALMIC
COMMUNITY
Start: 2025-03-07

## 2025-04-28 RX ADMIN — METHYLPREDNISOLONE ACETATE 80 MG: 80 INJECTION, SUSPENSION INTRA-ARTICULAR; INTRALESIONAL; INTRAMUSCULAR; SOFT TISSUE at 10:03

## 2025-04-28 RX ADMIN — LIDOCAINE HYDROCHLORIDE 2 ML: 10 INJECTION, SOLUTION EPIDURAL; INFILTRATION; INTRACAUDAL; PERINEURAL at 10:03

## 2025-04-28 NOTE — PROGRESS NOTES
[facility]              New Problem      Patient Name: Marline Bryant  : 1950  Primary Care Physician: Luis Sy MD        Chief Complaint:  Left shoulder pain    HPI:   Marline Bryant is a 74 y.o. year old who presents today for evaluation.  History of Present Illness  The patient presents for evaluation of left shoulder pain.    Approximately 3 weeks ago, an initial episode of neck soreness occurred, which was severe enough to restrict neck mobility. The discomfort subsequently radiated to the right shoulder, then resolved before manifesting in the left shoulder. Difficulty in lifting the left arm has persisted for the past 3 weeks. This is a new symptom. Pain at the base of the neck is reported during movement. She is able to lift objects if the arm is positioned straight down but experiences pain upon movement. Sleep is disrupted due to the pain, necessitating the use of Aleve for relief, although daytime use of Aleve is avoided.    PAST SURGICAL HISTORY: Knee replacement surgery.    Past Medical/Surgical, Social and Family History:  I have reviewed and/or updated pertinent history as noted in the medical record including:  Past Medical History:   Diagnosis Date    Arthritis     Bilateral bunions     Depression     Fracture of pubic ramus     Hearing loss     Plantar fasciitis      Past Surgical History:   Procedure Laterality Date    FOOT SURGERY Right 2023    JOINT REPLACEMENT      TOTAL HIP ARTHROPLASTY Right 2014    TOTAL KNEE ARTHROPLASTY Right 2011    TOTAL KNEE ARTHROPLASTY Left 03/10/2009    WRIST SURGERY Right      Social History     Occupational History    Not on file   Tobacco Use    Smoking status: Never     Passive exposure: Never    Smokeless tobacco: Never   Vaping Use    Vaping status: Never Used   Substance and Sexual Activity    Alcohol use: Yes     Comment: SOCIAL    Drug use: No    Sexual activity: Defer          Allergies: No Known  Allergies    Medications:   Home Medications:  Current Outpatient Medications on File Prior to Visit   Medication Sig    Cetirizine HCl 10 MG capsule Take  by mouth.    citalopram (CeleXA) 10 MG tablet Take 1 tablet by mouth Daily.    dorzolamide-timolol (COSOPT) 2-0.5 % ophthalmic solution     fluticasone (FLONASE) 50 MCG/ACT nasal spray Administer 1 spray into the nostril(s) as directed by provider Daily.    latanoprost (XALATAN) 0.005 % ophthalmic solution INSTILL 1 DROP INTO EACH EYE EVERY DAY AT BEDTIME    Multiple Vitamins-Minerals (MULTIVITAMIN WITH MINERALS) tablet tablet Take 1 tablet by mouth Daily.    thiamine (VITAMIN B-1) 100 MG tablet Take 1 tablet by mouth Daily.    vitamin C (ASCORBIC ACID) 250 MG tablet Take 2 tablets by mouth Daily.    Vitamin D, Cholecalciferol, (CHOLECALCIFEROL) 10 MCG (400 UNIT) tablet Take 1 tablet by mouth Daily.     No current facility-administered medications on file prior to visit.         ROS:  ROS negative except as listed in the HPI.    Physical Exam:   74 y.o. female  Body mass index is 33.95 kg/m²., 92.5 kg (204 lb)  Vitals:    04/28/25 0942   Temp: 97.7 °F (36.5 °C)     General: Alert, cooperative, appears well and in no observable distress. Appears stated age and BMI as listed above.  HEENT: Normocephalic, atraumatic on external visual inspection.  CV: No significant peripheral edema.  Respiratory: Normal respiratory effort.  Skin: Warm & well perfused; appropriate skin turgor.  Psych: Appropriate mood & affect.  Neuro: Gross sensation and motor intact in affected extremity/extremities.  Vascular: Peripheral pulses palpable in affected extremity/extremities.   Physical Exam  Neck: Presence of arthritis noted.    Musculoskeletal:  Right Shoulder: Limited range of motion compared to the left shoulder. Pain noted on movement.  Left Shoulder: Limited range of motion. Pain noted on movement.    MSK Exam:    Left Shoulder  No obvious deformities or wounds.   No redness or  significant swelling.  +Definitive painful arc.  +Impingement signs  +Neer Test  +Hawkin's Sign  Flexion 170  ER 40  IR lateral hip  Strength is 4/5 strength with isometric testing of the rotator cuff and painful    Right Shoulder  No deformity or wounds.  No redness or swelling.  No tenderness to palpation.  Full, painless AROM.  Cuff Strength 4+ to 5/5 with isometric testing of the rotator cuff.  Negative provocative testing.    Brief examination of the cervical spine did not reproduce any specific radicular pattern or symptoms but she does endorse some isolated neck pain with ROM.   Strength is reasonable and symmetric in the upper extremities.       Radiology:    Results  Imaging      The following X-rays were ordered/reviewed today to evaluate the patient's symptoms: Shoulder: AP, Scapular Y and Axillary Lateral of left shoulder(s) show some mild degenerative changes at the AC joint otherwise fairly unremarkable. There are degenerative changes noted in the cervical spine which is partially included on the field of view.    Procedure:   See Procedure Note: The potential risks and benefits of performing a diagnostic and therapeutic injection were discussed with the patient prior to procedure. Risks include, but are not limited to infection, swelling, transient increase in pain, bleeding, bruising. Patient was advised that injections are a diagnostic and therapeutic tool meaning they may not alleviate symptoms at all, or may only provide partial or temporary relief. Injection precautions and aftercare discussed.      [facility] [place]. Data/Labs: N/A    Assessment & Plan:      ICD-10-CM ICD-9-CM   1. Impingement syndrome of left shoulder  M75.42 726.2   2. Acute pain of left shoulder  M25.512 719.41     No orders of the defined types were placed in this encounter.    Orders Placed This Encounter   Procedures    Large Joint Arthrocentesis: L subacromial bursa    XR Shoulder 2+ View Left       Assessment & Plan  1. Left shoulder  pain:  Pain began approximately 3 weeks ago, initially starting with neck soreness and progressing to the right shoulder before settling in the left shoulder. Severe enough to disrupt sleep if Aleve is not taken. Limited range of motion and pain with certain movements. X-ray shows some arthritis in the neck and shoulder, but the primary issue appears to be soft tissue-related. Differential diagnosis includes early-stage frozen shoulder. Her cuff exam is symptomatic.    Steroid injection in the shoulder. Perform gentle stretching exercises as the pain subsides to prevent stiffness. If the pain persists, consider physical therapy and possibly an MRI for further evaluation. Could also consider a GH injection in follow up if this seems to be progressing towards a capsulitis.      Continue with activity modifications as needed/discussed.  Continue ICE and/or HEAT PRN.  Recommend to continue activity as tolerated, focus on stretching and strengthening of the joint.    Focus on posture and body mechanics to improve/prevent symptoms.   Patient encouraged to call with questions or concerns prior to follow up.  Will discuss with attending as needed.  Consider additional referrals, work up and/or advanced imaging as indicated or if patient fails to respond to conservative care.    Return in about 4 weeks (around 5/26/2025).    Patient or patient representative verbalized consent for the use of Ambient Listening during the visit with  ALEX Aguilar for chart documentation. 4/28/2025  10:10 EDT     Large Joint Arthrocentesis: L subacromial bursa  Date/Time: 4/28/2025 10:03 AM  Consent given by: patient  Site marked: site marked  Timeout: Immediately prior to procedure a time out was called to verify the correct patient, procedure, equipment, support staff and site/side marked as required   Supporting Documentation  Indications: pain   Procedure Details  Location: shoulder - L subacromial bursa  Preparation: Patient was  prepped and draped in the usual sterile fashion  Needle gauge: 21g.  Approach: posterior  Medications administered: 80 mg methylPREDNISolone acetate 80 MG/ML; 2 mL lidocaine PF 1% 1 %  Patient tolerance: patient tolerated the procedure well with no immediate complications           ALEX Russell    Dictation software was used to complete a portion or all of this note.

## 2025-06-02 ENCOUNTER — OFFICE VISIT (OUTPATIENT)
Dept: ORTHOPEDIC SURGERY | Facility: CLINIC | Age: 75
End: 2025-06-02
Payer: MEDICARE

## 2025-06-02 VITALS — BODY MASS INDEX: 34.02 KG/M2 | TEMPERATURE: 97.8 F | HEIGHT: 65 IN | WEIGHT: 204.2 LBS

## 2025-06-02 DIAGNOSIS — G89.29 CHRONIC LEFT SHOULDER PAIN: ICD-10-CM

## 2025-06-02 DIAGNOSIS — M25.512 CHRONIC LEFT SHOULDER PAIN: ICD-10-CM

## 2025-06-02 DIAGNOSIS — M67.912 DYSFUNCTION OF LEFT ROTATOR CUFF: Primary | ICD-10-CM

## 2025-06-02 NOTE — PROGRESS NOTES
[facility]              Follow Up      Patient Name: Marline Bryant  : 1950  Primary Care Physician: Luis Sy MD        Chief Complaint:  Left shoulder pain, worsening    HPI:   Marline Bryant is a 74 y.o. year old who presents today for evaluation.  History of Present Illness  The patient presents for evaluation of left shoulder pain.    She reports persistent discomfort in the left shoulder with no discernible improvement, she feels like the pain is getting worse. The pain is predominantly localized to the posterior and lateral aspect of the shoulder, extending upwards depending on her activities. There are no associated numbness or tingling sensations. The pain, which initially originated from the neck, has now radiated to the right shoulder. Despite the pain, she retains the ability to lift objects from the floor using her arm, provided she does not move it. However, she is unable to bend or elevate the arm. She utilizes a small pillow for support while sleeping to alleviate the discomfort. Her mobility is compromised, necessitating the use of a rail for descending stairs. She occasionally resorts to Aleve for pain management, which she finds somewhat effective. The subacromial injection we tried last visit did not alleviate her pain at all she stated.        Past Medical/Surgical, Social and Family History:  I have reviewed and/or updated pertinent history as noted in the medical record including:  Past Medical History:   Diagnosis Date    Arthritis     Bilateral bunions     Depression     Fracture of pubic ramus     Hearing loss     Plantar fasciitis      Past Surgical History:   Procedure Laterality Date    FOOT SURGERY Right 2023    JOINT REPLACEMENT      TOTAL HIP ARTHROPLASTY Right 2014    TOTAL KNEE ARTHROPLASTY Right 2011    TOTAL KNEE ARTHROPLASTY Left 03/10/2009    WRIST SURGERY Right      Social History     Occupational History    Not on file   Tobacco Use     Smoking status: Never     Passive exposure: Never    Smokeless tobacco: Never   Vaping Use    Vaping status: Never Used   Substance and Sexual Activity    Alcohol use: Yes     Comment: SOCIAL    Drug use: No    Sexual activity: Defer          Allergies: No Known Allergies    Medications:   Home Medications:  Current Outpatient Medications on File Prior to Visit   Medication Sig    Cetirizine HCl 10 MG capsule Take  by mouth.    citalopram (CeleXA) 10 MG tablet Take 1 tablet by mouth Daily.    dorzolamide-timolol (COSOPT) 2-0.5 % ophthalmic solution     fluticasone (FLONASE) 50 MCG/ACT nasal spray Administer 1 spray into the nostril(s) as directed by provider Daily.    latanoprost (XALATAN) 0.005 % ophthalmic solution INSTILL 1 DROP INTO EACH EYE EVERY DAY AT BEDTIME    Multiple Vitamins-Minerals (MULTIVITAMIN WITH MINERALS) tablet tablet Take 1 tablet by mouth Daily.    thiamine (VITAMIN B-1) 100 MG tablet Take 1 tablet by mouth Daily.    vitamin C (ASCORBIC ACID) 250 MG tablet Take 2 tablets by mouth Daily.    Vitamin D, Cholecalciferol, (CHOLECALCIFEROL) 10 MCG (400 UNIT) tablet Take 1 tablet by mouth Daily.     No current facility-administered medications on file prior to visit.         ROS:  ROS negative except as listed in the HPI.    Physical Exam:   74 y.o. female  Body mass index is 33.98 kg/m²., 92.6 kg (204 lb 3.2 oz)  Vitals:    06/02/25 1119   Temp: 97.8 °F (36.6 °C)     General: Alert, cooperative, appears well and in no observable distress. Appears stated age and BMI as listed above.  HEENT: Normocephalic, atraumatic on external visual inspection.  CV: No significant peripheral edema.  Respiratory: Normal respiratory effort.  Skin: Warm & well perfused; appropriate skin turgor.  Psych: Appropriate mood & affect.  Neuro: Gross sensation and motor intact in affected extremity/extremities.  Vascular: Peripheral pulses palpable in affected extremity/extremities.   Physical Exam  Musculoskeletal:  Left  shoulder: Pain in the back of the shoulder, radiating to the arm. Limited range of motion with pain on elevation and reaching behind the back. Strength testing reveals difficulty holding the arm up and resisting pressure.    MSK Exam:  Left Shoulder  No obvious deformities or wounds.   No redness or significant swelling.  Tenderness  lateral acromial, posterior acromial  Definitive painful arc.  +empty can  Negative drop arm test  AROM  Flexion 160 passive to 170 with significant pain  ER 50 pain  IR SI region pain    Rotator Cuff Strength:  Supraspinatus: 3+  ER: 3+   IR: 4+  Isometric testing of the rotator cuff is painful.    Right Shoulder  No deformity or wounds.  No redness or swelling.  No tenderness to palpation.  Full, painless AROM.  Cuff Strength 4+ to 5/5 with isometric testing of the rotator cuff and painless.  Negative provocative testing.    Brief examination of the cervical spine did not reproduce any specific radicular pattern or symptoms.   Strength is reasonable and symmetric in the upper extremities.       Radiology:    No new images were needed at the visit today.     4/28/2025: Shoulder: AP, Scapular Y and Axillary Lateral of left shoulder(s) show some mild degenerative changes at the AC joint otherwise fairly unremarkable. There are degenerative changes noted in the cervical spine which is partially included on the field of view.   Results      Procedure:   N/A      Misc. Data/Labs: N/A    Assessment & Plan:        ICD-10-CM ICD-9-CM   1. Dysfunction of left rotator cuff  M67.912 726.10   2. Chronic left shoulder pain  M25.512 719.41    G89.29 338.29     No orders of the defined types were placed in this encounter.    Orders Placed This Encounter   Procedures    MRI Shoulder Left Without Contrast       Assessment & Plan  1. Left shoulder pain:    An MRI of the left shoulder will be ordered at [facility] to obtain a clearer understanding of the condition. Concern for a high grade or full thickness  cuff tear given severity of symptoms and exam. Referred pain sources remain in the differential but her shoulder exam was fairly telling. Continue the current regimen of Aleve for pain management- she is using this sparingly so I think that is reasonable. She could use tylenol or topicals otherwise. If the MRI reveals a significant issue such as a tear, a referral to a surgeon will be considered to discuss potential treatment options.    Follow-up: The results of the MRI will be reviewed a day or two after the test.    Continue with activity modifications as needed/discussed.  Continue ICE and/or HEAT PRN.  Recommend to continue activity as tolerated, focus on stretching and strengthening of the joint.    Focus on posture and body mechanics to improve/prevent symptoms.   Patient encouraged to call with questions or concerns prior to follow up.  Will discuss with attending as needed.  Consider additional referrals, work up and/or advanced imaging as indicated or if patient fails to respond to conservative care.    Return for follow up after the MRI.  Patient or patient representative verbalized consent for the use of Ambient Listening during the visit with  ALEX Aguilar for chart documentation. 6/2/2025  11:35 EDT    ALEX Russell    Dictation software was used to complete a portion or all of this note.

## 2025-06-05 ENCOUNTER — TELEPHONE (OUTPATIENT)
Dept: ORTHOPEDIC SURGERY | Facility: CLINIC | Age: 75
End: 2025-06-05

## 2025-06-10 ENCOUNTER — HOSPITAL ENCOUNTER (OUTPATIENT)
Dept: MRI IMAGING | Facility: HOSPITAL | Age: 75
Discharge: HOME | End: 2025-06-10
Admitting: NURSE PRACTITIONER
Payer: MEDICARE

## 2025-06-10 DIAGNOSIS — G89.29 CHRONIC LEFT SHOULDER PAIN: ICD-10-CM

## 2025-06-10 DIAGNOSIS — M67.912 DYSFUNCTION OF LEFT ROTATOR CUFF: ICD-10-CM

## 2025-06-10 DIAGNOSIS — M25.512 CHRONIC LEFT SHOULDER PAIN: ICD-10-CM

## 2025-06-10 PROCEDURE — 73221 MRI JOINT UPR EXTREM W/O DYE: CPT

## 2025-06-26 ENCOUNTER — OFFICE VISIT (OUTPATIENT)
Dept: ORTHOPEDIC SURGERY | Facility: CLINIC | Age: 75
End: 2025-06-26
Payer: MEDICARE

## 2025-06-26 VITALS — BODY MASS INDEX: 33.76 KG/M2 | TEMPERATURE: 98 F | WEIGHT: 202.6 LBS | HEIGHT: 65 IN

## 2025-06-26 DIAGNOSIS — M25.512 CHRONIC LEFT SHOULDER PAIN: Primary | ICD-10-CM

## 2025-06-26 DIAGNOSIS — M19.012 GLENOHUMERAL ARTHRITIS, LEFT: ICD-10-CM

## 2025-06-26 DIAGNOSIS — G89.29 CHRONIC LEFT SHOULDER PAIN: Primary | ICD-10-CM

## 2025-06-26 DIAGNOSIS — M67.912 DYSFUNCTION OF LEFT ROTATOR CUFF: ICD-10-CM

## 2025-06-26 RX ORDER — LIDOCAINE HYDROCHLORIDE 10 MG/ML
2 INJECTION, SOLUTION EPIDURAL; INFILTRATION; INTRACAUDAL; PERINEURAL
Status: COMPLETED | OUTPATIENT
Start: 2025-06-26 | End: 2025-06-26

## 2025-06-26 RX ORDER — METHYLPREDNISOLONE ACETATE 40 MG/ML
80 INJECTION, SUSPENSION INTRA-ARTICULAR; INTRALESIONAL; INTRAMUSCULAR; SOFT TISSUE
Status: COMPLETED | OUTPATIENT
Start: 2025-06-26 | End: 2025-06-26

## 2025-06-26 RX ADMIN — METHYLPREDNISOLONE ACETATE 80 MG: 40 INJECTION, SUSPENSION INTRA-ARTICULAR; INTRALESIONAL; INTRAMUSCULAR; SOFT TISSUE at 10:07

## 2025-06-26 RX ADMIN — LIDOCAINE HYDROCHLORIDE 2 ML: 10 INJECTION, SOLUTION EPIDURAL; INFILTRATION; INTRACAUDAL; PERINEURAL at 10:07

## 2025-06-26 NOTE — PROGRESS NOTES
[facility]              Follow Up      Patient Name: Marline Bryant  : 1950  Primary Care Physician: Luis Sy MD        Chief Complaint: Left shoulder pain    HPI:   Marline Bryant is a 74 y.o. year old who presents today for evaluation.  I last saw her approximately 3 weeks ago with left shoulder pain.  Initially we thought this was Rotator Cuff pathology she did not get relief with an injection so we sent her for an MRI which showed some slight rotator cuff pathology mostly tendinitis a little small partial tear.  She had more significant osteoarthritis of the glenohumeral joint than what we appreciated on imaging.  She is here today to discuss this and additional treatments.  History of Present Illness         Past Medical/Surgical, Social and Family History:  I have reviewed and/or updated pertinent history as noted in the medical record including:  Past Medical History:   Diagnosis Date    Arthritis     Bilateral bunions     Depression     Fracture of pubic ramus     Hearing loss     Plantar fasciitis      Past Surgical History:   Procedure Laterality Date    FOOT SURGERY Right 2023    JOINT REPLACEMENT      TOTAL HIP ARTHROPLASTY Right 2014    TOTAL KNEE ARTHROPLASTY Right 2011    TOTAL KNEE ARTHROPLASTY Left 03/10/2009    WRIST SURGERY Right      Social History     Occupational History    Not on file   Tobacco Use    Smoking status: Never     Passive exposure: Never    Smokeless tobacco: Never   Vaping Use    Vaping status: Never Used   Substance and Sexual Activity    Alcohol use: Yes     Comment: SOCIAL    Drug use: No    Sexual activity: Defer          Allergies: No Known Allergies    Medications:   Home Medications:  Current Outpatient Medications on File Prior to Visit   Medication Sig    Cetirizine HCl 10 MG capsule Take  by mouth.    citalopram (CeleXA) 10 MG tablet Take 1 tablet by mouth Daily.    dorzolamide-timolol (COSOPT) 2-0.5 % ophthalmic solution      fluticasone (FLONASE) 50 MCG/ACT nasal spray Administer 1 spray into the nostril(s) as directed by provider Daily.    latanoprost (XALATAN) 0.005 % ophthalmic solution INSTILL 1 DROP INTO EACH EYE EVERY DAY AT BEDTIME    Multiple Vitamins-Minerals (MULTIVITAMIN WITH MINERALS) tablet tablet Take 1 tablet by mouth Daily.    thiamine (VITAMIN B-1) 100 MG tablet Take 1 tablet by mouth Daily.    vitamin C (ASCORBIC ACID) 250 MG tablet Take 2 tablets by mouth Daily.    Vitamin D, Cholecalciferol, (CHOLECALCIFEROL) 10 MCG (400 UNIT) tablet Take 1 tablet by mouth Daily.     No current facility-administered medications on file prior to visit.         ROS:  ROS negative except as listed in the HPI.    Physical Exam:   74 y.o. female  Body mass index is 33.71 kg/m²., 91.9 kg (202 lb 9.6 oz)  Vitals:    06/26/25 0947   Temp: 98 °F (36.7 °C)     General: Alert, cooperative, appears well and in no observable distress. Appears stated age and BMI as listed above.  HEENT: Normocephalic, atraumatic on external visual inspection.  CV: No significant peripheral edema.  Respiratory: Normal respiratory effort.  Skin: Warm & well perfused; appropriate skin turgor.  Psych: Appropriate mood & affect.  Neuro: Gross sensation and motor intact in affected extremity/extremities.  Vascular: Peripheral pulses palpable in affected extremity/extremities.   Physical Exam      MSK Exam:  Left Shoulder  No obvious deformities or wounds.   No redness or significant swelling.  Tenderness  lateral acromial, posterior acromial  Definitive painful arc.  +empty can  Negative drop arm test  AROM  Flexion 160 passive to 170 with significant pain  ER 50 pain  IR SI region pain     Rotator Cuff Strength:  Supraspinatus: 3+  ER: 3+   IR: 4+  Isometric testing of the rotator cuff is painful.     Right Shoulder  No deformity or wounds.  No redness or swelling.  No tenderness to palpation.  Full, painless AROM.  Cuff Strength 4+ to 5/5 with isometric testing of the  rotator cuff and painless.  Negative provocative testing.     Brief examination of the cervical spine did not reproduce any specific radicular pattern or symptoms.   Strength is reasonable and symmetric in the upper extremities.        Radiology:    No new images were needed at the visit today.     4/28/2025: Shoulder: AP, Scapular Y and Axillary Lateral of left shoulder(s) show some mild degenerative changes at the AC joint otherwise fairly unremarkable. There are degenerative changes noted in the cervical spine which is partially included on the field of view.     MRI SHOULDER LEFT WO CONTRAST-     Date of Exam: 6/10/2025 9:47 PM     Indication: Left shoulder pain for 6 weeks. Worsening pain and  dysfunction. No known trauma. Suspected rotator cuff tear.     Comparison: Chest radiographs 11/11/2022.     Technique: Multiplanar, multisequence MR imaging of the shoulder was  performed without contrast.     FINDINGS:     Rotator cuff: Mild to moderate supraspinatus tendinopathy with mild  partial-thickness articular sided fraying at the footprint. The  infraspinatus, subscapularis, and teres minor tendons are intact. Mild  (grade 1/4) diffuse muscular fatty atrophy. No myositis.     Biceps tendon: The intra-articular long head biceps tendon is normal in  signal, morphology, and position in the bicipital groove.      Acromioclavicular Arch: Mild age-appropriate hypertrophic degenerative  changes at the acromioclavicular joint. Mild low signal thickening of  the coracoacromial ligament. Type II acromion, without lateral  downsloping. Trace fluid in the subacromial/subdeltoid bursa.     Glenohumeral joint: The humeral head and glenoid are congruent. Moderate  joint effusion. Diffuse synovial thickening and/or intra-articular  debris. An ovoid 1 cm intermediate signal filling defect in the superior  subscapularis recess could represent an intra-articular body or nodular  synovial thickening. Moderate to severe  chondromalacia with large  regions of full-thickness or near full-thickness chondral loss from the  posterior glenoid and the superomedial humeral head. The joint capsule  is within normal limits.     Labrum: No evidence of acute labral tear. No paralabral cyst.     Bone: Tiny degenerative subchondral cystic changes in the distal  clavicle and acromion. Nonspecific tiny subcortical cystic changes in  the greater and lesser tuberosities. No acute fracture. No concerning  bone marrow lesion or marrow replacing process.     Soft tissues: No soft tissue mass.     IMPRESSION:     1. 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  2. Mild to moderate supraspinatus tendinopathy with mild  partial-thickness articular sided fraying at the footprint.  3. Mild age-appropriate DJD at the AC joint and mild low signal  thickening of the coracoacromial ligament.              This report was finalized on 6/11/2025 5:27 PM by Shadi Cedillo MD on  Workstation: [id number]  Results      Procedure:   See Procedure Note: The potential risks and benefits of performing a diagnostic and therapeutic injection were discussed with the patient prior to procedure. Risks include, but are not limited to infection, swelling, transient increase in pain, bleeding, bruising. Patient was advised that injections are a diagnostic and therapeutic tool meaning they may not alleviate symptoms at all, or may only provide partial or temporary relief. Injection precautions and aftercare discussed.      [facility] [place] Data/Labs: N/A    Assessment & Plan:        ICD-10-CM ICD-9-CM   1. Chronic left shoulder pain  M25.512 719.41    G89.29 338.29   2. Dysfunction of left rotator cuff  M67.912 726.10   3. Glenohumeral arthritis, left  M19.012 716.91     No orders of the defined types were placed in this  encounter.    Orders Placed This Encounter   Procedures    Large Joint Arthrocentesis: L glenohumeral     Patient's MRI demonstrated some rotator cuff pathology but more significant glenohumeral arthritis than what was appreciated on her x-rays.  I would like to try 1 more injection into the glenohumeral joint space to see if this gives her better relief in her symptoms.  If she does not find adequate relief I would recommend a consultation with Dr. Ellsworth.  I told her to let me know in a few weeks how she is feeling and we can certainly proceed from there.  If the injection is helpful we could repeat that down the road probably no sooner than 3 months but I would wait until her pain returns before performing another 1.  Assessment & Plan      Continue with activity modifications as needed/discussed.  Continue ICE and/or HEAT PRN.  Recommend to continue activity as tolerated, focus on stretching and strengthening of the joint.    Focus on posture and body mechanics to improve/prevent symptoms.   Patient encouraged to call with questions or concerns prior to follow up.  Will discuss with attending as needed.  Consider additional referrals, work up and/or advanced imaging as indicated or if patient fails to respond to conservative care.    Return in about 6 weeks (around 8/7/2025) for Recheck. If symptoms change call for sooner appt..      ALEX Russell    Dictation software was used to complete a portion or all of this note.

## 2025-06-26 NOTE — PROGRESS NOTES
Large Joint Arthrocentesis: L glenohumeral  Date/Time: 6/26/2025 10:07 AM  Consent given by: patient  Site marked: site marked  Timeout: Immediately prior to procedure a time out was called to verify the correct patient, procedure, equipment, support staff and site/side marked as required   Supporting Documentation  Indications: pain   Procedure Details  Location: shoulder - L glenohumeral  Preparation: Patient was prepped and draped in the usual sterile fashion  Needle gauge: 21G.  Approach: anterior  Medications administered: 2 mL lidocaine PF 1% 1 %; 80 mg methylPREDNISolone acetate 40 MG/ML  Patient tolerance: patient tolerated the procedure well with no immediate complications

## 2025-08-18 ENCOUNTER — HOSPITAL ENCOUNTER (OUTPATIENT)
Facility: HOSPITAL | Age: 75
Discharge: HOME | End: 2025-08-18
Attending: INTERNAL MEDICINE | Admitting: INTERNAL MEDICINE
Payer: MEDICARE

## 2025-08-18 VITALS
TEMPERATURE: 97.7 F | WEIGHT: 202.38 LBS | HEIGHT: 65 IN | HEART RATE: 72 BPM | OXYGEN SATURATION: 97 % | BODY MASS INDEX: 33.72 KG/M2 | SYSTOLIC BLOOD PRESSURE: 139 MMHG | RESPIRATION RATE: 18 BRPM | DIASTOLIC BLOOD PRESSURE: 97 MMHG

## 2025-08-18 DIAGNOSIS — T63.441A BEE STING, ACCIDENTAL OR UNINTENTIONAL, INITIAL ENCOUNTER: Primary | ICD-10-CM

## 2025-08-18 DIAGNOSIS — T78.40XA ALLERGIC REACTION, INITIAL ENCOUNTER: ICD-10-CM

## 2025-08-18 PROCEDURE — G0463 HOSPITAL OUTPT CLINIC VISIT: HCPCS | Performed by: INTERNAL MEDICINE

## 2025-08-18 PROCEDURE — 63710000001 PREDNISONE PER 1 MG: Performed by: INTERNAL MEDICINE

## 2025-08-18 RX ORDER — PREDNISONE 20 MG/1
20 TABLET ORAL ONCE
Status: COMPLETED | OUTPATIENT
Start: 2025-08-18 | End: 2025-08-18

## 2025-08-18 RX ORDER — LORATADINE 10 MG/1
10 TABLET ORAL DAILY PRN
Qty: 14 TABLET | Refills: 0 | Status: SHIPPED | OUTPATIENT
Start: 2025-08-18 | End: 2025-09-01

## 2025-08-18 RX ORDER — FAMOTIDINE 20 MG/1
40 TABLET, FILM COATED ORAL ONCE
Status: COMPLETED | OUTPATIENT
Start: 2025-08-18 | End: 2025-08-18

## 2025-08-18 RX ORDER — PREDNISONE 20 MG/1
20 TABLET ORAL DAILY
Qty: 4 TABLET | Refills: 0 | Status: SHIPPED | OUTPATIENT
Start: 2025-08-18 | End: 2025-08-22

## 2025-08-18 RX ADMIN — PREDNISONE 20 MG: 20 TABLET ORAL at 09:43

## 2025-08-18 RX ADMIN — FAMOTIDINE 40 MG: 20 TABLET, FILM COATED ORAL at 09:43
